# Patient Record
Sex: MALE | URBAN - METROPOLITAN AREA
[De-identification: names, ages, dates, MRNs, and addresses within clinical notes are randomized per-mention and may not be internally consistent; named-entity substitution may affect disease eponyms.]

---

## 2024-09-17 ENCOUNTER — OFFICE VISIT (OUTPATIENT)
Dept: URBAN - METROPOLITAN AREA CLINIC 72 | Facility: CLINIC | Age: 69
End: 2024-09-17
Payer: MEDICARE

## 2024-09-17 ENCOUNTER — LAB OUTSIDE AN ENCOUNTER (OUTPATIENT)
Dept: URBAN - METROPOLITAN AREA CLINIC 72 | Facility: CLINIC | Age: 69
End: 2024-09-17

## 2024-09-17 ENCOUNTER — DASHBOARD ENCOUNTERS (OUTPATIENT)
Age: 69
End: 2024-09-17

## 2024-09-17 VITALS
TEMPERATURE: 97.9 F | WEIGHT: 224 LBS | HEIGHT: 70 IN | BODY MASS INDEX: 32.07 KG/M2 | SYSTOLIC BLOOD PRESSURE: 148 MMHG | HEART RATE: 91 BPM | DIASTOLIC BLOOD PRESSURE: 67 MMHG

## 2024-09-17 DIAGNOSIS — D50.8 OTHER IRON DEFICIENCY ANEMIA: ICD-10-CM

## 2024-09-17 DIAGNOSIS — D61.818 PANCYTOPENIA: ICD-10-CM

## 2024-09-17 DIAGNOSIS — R79.89 ELEVATED LFTS: ICD-10-CM

## 2024-09-17 DIAGNOSIS — K74.69 OTHER CIRRHOSIS OF LIVER: ICD-10-CM

## 2024-09-17 PROBLEM — 19943007: Status: ACTIVE | Noted: 2024-09-17

## 2024-09-17 PROBLEM — 87522002: Status: ACTIVE | Noted: 2024-09-17

## 2024-09-17 PROBLEM — 127034005: Status: ACTIVE | Noted: 2024-09-17

## 2024-09-17 PROCEDURE — 99204 OFFICE O/P NEW MOD 45 MIN: CPT | Performed by: INTERNAL MEDICINE

## 2024-09-17 RX ORDER — THIAMINE HCL 100 MG
1 TABLET TABLET ORAL ONCE A DAY
Status: ACTIVE | COMMUNITY

## 2024-09-17 RX ORDER — ARIPIPRAZOLE 5 MG/1
1 TABLET TABLET ORAL ONCE A DAY
Status: ACTIVE | COMMUNITY

## 2024-09-17 RX ORDER — FERROUS SULFATE 325(65) MG
1 TABLET TABLET ORAL
Status: ACTIVE | COMMUNITY

## 2024-09-17 RX ORDER — QUETIAPINE 200 MG/1
1 TABLET TABLET, FILM COATED ORAL ONCE A DAY
Status: ACTIVE | COMMUNITY

## 2024-09-17 NOTE — HPI-TODAY'S VISIT:
Mr. Lugo is a pleasant 68-year-old presents as a new patient for evaluation of "liver issues."  He has past medical history of bipolar disorder, depression, coronary artery disease with stent not on anticoagulation.  He was seen in the hospital in August with anemia and occult positive blood in his stool, had an upper endoscopy by a surgeon that showed gastritis, received 2 units of blood while hospitalized for hemoglobin of 7.  He was noted to have pancytopenia.  He had a CT scan that showed coronary artery calcifications mild nodular contour of the liver, cholelithiasis without cholecystitis, splenomegaly no evidence of active bleeding.  Lab work 8/22/2024 WBC 1.6, hemoglobin 6.9, repeat 6.4, hematocrit 20.4 with repeat 19.3, MCV 82, platelet count 117, sodium 127, creatinine 0.74, albumin 3.6, AST 40, ALT 20, alk phos 118, bilirubin 1.2, INR 1.2, ferritin 14.9, TIBC 447 with iron saturation of 30, hemoglobin 6.4  8/24/2024 WBC 2.1, hemoglobin 7.4, MCV 82, platelet count 112, sodium 133, potassium 3.8, creatinine 0.63, albumin 3.2, alk phos 99, ALT 20, AST 37, bilirubin 1.7  He is somewhat a poor historian.  He reports that he was told back in March that he likely had cirrhosis.  He stopped drinking at that time but cannot tell me much more about those events.  He has never seen anybody in regards to cirrhosis that he is aware of.  He reports he has had no further issues with bleeding.  He believes he had a colonoscopy within the last 5 years and did not have any colon polyps.  He denies any weight gain.  Does appear that he is on some diuretics although he is not sure of the dose.

## 2024-09-17 NOTE — EXAM-PHYSICAL EXAM
General--no acute distress, resting comfortably Eyes--anicteric, no pallor HENT--normocephalic, atraumatic head Neck--no lymphadenopathy, symmetric Chest--non labored breathing, equal rise Abdomen--soft, non tender, non distended, no organomegaly Ext: ROBERT, no obvious sores or rashes

## 2024-10-15 ENCOUNTER — TELEPHONE ENCOUNTER (OUTPATIENT)
Dept: URBAN - METROPOLITAN AREA CLINIC 113 | Facility: CLINIC | Age: 69
End: 2024-10-15

## 2024-10-16 ENCOUNTER — OFFICE VISIT (OUTPATIENT)
Dept: URBAN - METROPOLITAN AREA CLINIC 72 | Facility: CLINIC | Age: 69
End: 2024-10-16
Payer: MEDICARE

## 2024-10-16 VITALS
SYSTOLIC BLOOD PRESSURE: 142 MMHG | HEIGHT: 70 IN | TEMPERATURE: 98.1 F | BODY MASS INDEX: 29.58 KG/M2 | HEART RATE: 64 BPM | DIASTOLIC BLOOD PRESSURE: 73 MMHG | WEIGHT: 206.6 LBS

## 2024-10-16 DIAGNOSIS — D61.818 PANCYTOPENIA: ICD-10-CM

## 2024-10-16 DIAGNOSIS — D50.8 OTHER IRON DEFICIENCY ANEMIA: ICD-10-CM

## 2024-10-16 DIAGNOSIS — Z91.199 NON-ADHERENCE TO MEDICAL TREATMENT: ICD-10-CM

## 2024-10-16 DIAGNOSIS — R79.89 ELEVATED LFTS: ICD-10-CM

## 2024-10-16 DIAGNOSIS — K74.69 CIRRHOSIS, CRYPTOGENIC: ICD-10-CM

## 2024-10-16 PROCEDURE — 99214 OFFICE O/P EST MOD 30 MIN: CPT | Performed by: INTERNAL MEDICINE

## 2024-10-16 RX ORDER — ARIPIPRAZOLE 5 MG/1
1 TABLET TABLET ORAL ONCE A DAY
Status: ACTIVE | COMMUNITY

## 2024-10-16 RX ORDER — THIAMINE HCL 100 MG
1 TABLET TABLET ORAL ONCE A DAY
Status: ACTIVE | COMMUNITY

## 2024-10-16 RX ORDER — FERROUS SULFATE 325(65) MG
1 TABLET TABLET ORAL
Status: ACTIVE | COMMUNITY

## 2024-10-16 RX ORDER — QUETIAPINE 200 MG/1
1 TABLET TABLET, FILM COATED ORAL ONCE A DAY
Status: ACTIVE | COMMUNITY

## 2024-10-16 NOTE — HPI-TODAY'S VISIT:
Mr. Lugo returns for follow-up.  Recall he is a 69-year-old last seen in office on 9/17/2024.  He was referred to us for anemia and "liver issues".  He was hospitalized in August with anemia had occult positive blood in stool, upper endoscopy by surgeon showed gastritis.  No biopsies were done.  A CT scan with contrast showed coronary artery calcifications and nodular contour of the liver, cholelithiasis, splenomegaly no evidence of active bleeding.  He was transfused during the hospitalization.  He was noted to have pancytopenia as well.  He does have a history of alcohol use.  He is a poor historian.  He believes he had a colonoscopy in the recent past.  We arranged for lab work and imaging including chronic liver disease workup labs.  A phone call was received yesterday stating that he did not have any of the lab work or imaging done. He was on diuretics when we saw him he is not taking this currently.  Does appear that his weight has decreased by almost 20 pounds.  He is a little more conversive today.  He is unsure if he got blood work done for the ultrasound done.  Again according to the phone call received yesterday and he has not done this.  He feels as though his abdomen is still protuberant but notes that his swelling has improved some.  He has not drank any alcohol.  He is concerned that his weight has not changed as much as he would like.  He denies any abdominal pain etc.  He asks "do I have cirrhosis?  Am I going to die?"  Summary of lab work: 8/22/2024 WBC 1.6, hemoglobin 6.9, platelet count 117, sodium 127, creatinine 0.74, albumin 3.6, AST 40, ALT 20, alk phos 118, bilirubin 1.2, INR 1.2, ferritin 14.9, TIBC 447  8/24/2024 WBC 2.1, hemoglobin 7.4, platelet 112, sodium 133, testing 3.8, creatinine 0.63, albumin 3.2, alk phos 99, ALT 20, AST 37, bilirubin 1.7

## 2024-11-01 ENCOUNTER — TELEPHONE ENCOUNTER (OUTPATIENT)
Dept: URBAN - METROPOLITAN AREA CLINIC 72 | Facility: CLINIC | Age: 69
End: 2024-11-01

## 2024-11-19 ENCOUNTER — OFFICE VISIT (OUTPATIENT)
Dept: URBAN - METROPOLITAN AREA CLINIC 72 | Facility: CLINIC | Age: 69
End: 2024-11-19
Payer: MEDICARE

## 2024-11-19 ENCOUNTER — LAB OUTSIDE AN ENCOUNTER (OUTPATIENT)
Dept: URBAN - METROPOLITAN AREA CLINIC 72 | Facility: CLINIC | Age: 69
End: 2024-11-19

## 2024-11-19 VITALS
TEMPERATURE: 98.2 F | DIASTOLIC BLOOD PRESSURE: 74 MMHG | HEART RATE: 73 BPM | HEIGHT: 70 IN | WEIGHT: 207.2 LBS | SYSTOLIC BLOOD PRESSURE: 148 MMHG | BODY MASS INDEX: 29.66 KG/M2

## 2024-11-19 DIAGNOSIS — R77.2 ELEVATED AFP: ICD-10-CM

## 2024-11-19 DIAGNOSIS — K83.8 COMMON BILE DUCT DILATATION: ICD-10-CM

## 2024-11-19 DIAGNOSIS — R17 ELEVATED BILIRUBIN: ICD-10-CM

## 2024-11-19 DIAGNOSIS — R93.5 ABNORMAL ABDOMINAL ULTRASOUND: ICD-10-CM

## 2024-11-19 DIAGNOSIS — K74.60 CIRRHOSIS OF LIVER WITHOUT ASCITES, UNSPECIFIED HEPATIC CIRRHOSIS TYPE: ICD-10-CM

## 2024-11-19 PROBLEM — 26165005: Status: ACTIVE | Noted: 2024-11-19

## 2024-11-19 PROBLEM — 15633921000119109: Status: ACTIVE | Noted: 2024-11-19

## 2024-11-19 PROBLEM — 166561008: Status: ACTIVE | Noted: 2024-11-19

## 2024-11-19 PROBLEM — 123608004: Status: ACTIVE | Noted: 2024-11-19

## 2024-11-19 PROCEDURE — 99214 OFFICE O/P EST MOD 30 MIN: CPT

## 2024-11-19 RX ORDER — THIAMINE HCL 100 MG
1 TABLET TABLET ORAL ONCE A DAY
Status: ACTIVE | COMMUNITY

## 2024-11-19 RX ORDER — ARIPIPRAZOLE 5 MG/1
1 TABLET TABLET ORAL ONCE A DAY
Status: ACTIVE | COMMUNITY

## 2024-11-19 RX ORDER — QUETIAPINE 200 MG/1
1 TABLET TABLET, FILM COATED ORAL ONCE A DAY
Status: ACTIVE | COMMUNITY

## 2024-11-19 RX ORDER — FERROUS SULFATE 325(65) MG
1 TABLET TABLET ORAL
Status: ACTIVE | COMMUNITY

## 2024-11-19 NOTE — EXAM-FUNCTIONAL ASSESSMENT
General--no acute distress, normal appearance Eyes--anicteric, no pallor HENT--normocephalic, atraumatic head Neck--no lymphadenopathy, symmetric Chest--non labored, equal chest rise Heart--regular rate Abdomen--soft, non tender, non distended, no organomegaly Skin--no rashes, no jaundice Neurologic--Alert and oriented x 3, answers questions appropriately Psychiatric--stable mood, appropriate affect  16-Jan-2020 14:52

## 2024-11-19 NOTE — HPI-TODAY'S VISIT:
Patient is a 69-year-old male last seen in the office on 10/16/2024 by Dr. Carolina Del Toro for cirrhosis, iron deficiency anemia, pancytopenia, elevated LFTs, and nonadherence to medical treatment.  Ultrasound and lab work from previous issue visit was once again given to patient to assess his liver disease.  Patient was on diuretics, but stopped them.  Weight has decreased by almost 20 pounds.  Patient is seen in the office today to review his labs and scans. He is feeling good. His weight has gone down - he is staying steady around 206. No ascites, has some confusion - but it is "not as bad as it was when he was first diagnosed". No bruising issues.   He thinks he had an EGD/Colon last year at the [facility]. Will request records. NO polyps.   BM's daily. NO melena or blood in the stool. No GERD, dysphagia, or heartburn.  No abd pain.   No FH of colon/GI cancer.

## 2024-11-19 NOTE — HPI-OTHER HISTORIES
Labs:  10/2/24 -immunoglobulin G 1892, IgA 1054, BUN 11, creatinine 0.85, , K3.8, , total bili 1.2, AST 33, ALT 15, TIBC 457, UIBC 344, iron 113, iron saturation 25%, hep B nonreactive, hep C nonreactive, ESR 76, CRP 9, ferritin 33, hep A (-)  10/16/2024-WBC 3.1, RBC 3.33, hemoglobin 9.0, hematocrit 27.4, MCV 82, platelet count 141, smooth muscle antibody 26, mitochondrial antibody less than 20, HCV nonreactive, liver kidney microsomal antibody 3.5, immunoglobulin A 946, immunoglobulin M 107, immunoglobulin G 1756, hep B antibody nonreactive, hep B antigen negative, ceruloplasmin 30.3, , K3.9, BUN 11, creatinine 0.68, , ALT 24, AST 39, total bili 1.3, PT 12.5, INR 1.2, hemochromatosis not detected, , EFREN positive  8/22/2024 WBC 1.6, hemoglobin 6.9, platelet count 117, sodium 127, creatinine 0.74, albumin 3.6, AST 40, ALT 20, alk phos 118, bilirubin 1.2, INR 1.2, ferritin 14.9, TIBC 447 8/24/2024 WBC 2.1, hemoglobin 7.4, platelet 112, sodium 133, testing 3.8, creatinine 0.63, albumin 3.2, alk phos 99, ALT 20, AST 37, bilirubin 1.7  DI:  10/23/2024-ultrasound abdomen complete imaging demonstrates mild lobulated contour.  Hepatic parenchyma appears preserved.  No masses.  Liver measures 22.7 cm.  Hepatopetal directional flow of the main portal vein.  Gallbladder contracted.  Small hyperechoic focus measuring 5.6 mm adherent to the wall which may reside polyp versus small stone.  CBD 6.8 mm.  Spleen is enlarged measuring 17.2 cm.  A CT scan with contrast showed coronary artery calcifications and nodular contour of the liver, cholelithiasis, splenomegaly no evidence of active bleeding.

## 2024-12-17 ENCOUNTER — OFFICE VISIT (OUTPATIENT)
Dept: URBAN - METROPOLITAN AREA CLINIC 72 | Facility: CLINIC | Age: 69
End: 2024-12-17
Payer: MEDICARE

## 2024-12-17 VITALS
DIASTOLIC BLOOD PRESSURE: 69 MMHG | TEMPERATURE: 98.1 F | HEIGHT: 70 IN | SYSTOLIC BLOOD PRESSURE: 148 MMHG | BODY MASS INDEX: 30.26 KG/M2 | WEIGHT: 211.4 LBS | HEART RATE: 68 BPM

## 2024-12-17 DIAGNOSIS — Z91.199 NON-ADHERENCE TO MEDICAL TREATMENT: ICD-10-CM

## 2024-12-17 DIAGNOSIS — K74.60 CIRRHOSIS OF LIVER WITHOUT ASCITES, UNSPECIFIED HEPATIC CIRRHOSIS TYPE: ICD-10-CM

## 2024-12-17 DIAGNOSIS — R93.5 ABNORMAL ABDOMINAL ULTRASOUND: ICD-10-CM

## 2024-12-17 DIAGNOSIS — K83.8 COMMON BILE DUCT DILATATION: ICD-10-CM

## 2024-12-17 PROCEDURE — 99214 OFFICE O/P EST MOD 30 MIN: CPT | Performed by: INTERNAL MEDICINE

## 2024-12-17 RX ORDER — FERROUS SULFATE 325(65) MG
1 TABLET TABLET ORAL
Status: ACTIVE | COMMUNITY

## 2024-12-17 RX ORDER — ARIPIPRAZOLE 5 MG/1
1 TABLET TABLET ORAL ONCE A DAY
Status: ACTIVE | COMMUNITY

## 2024-12-17 RX ORDER — QUETIAPINE 200 MG/1
1 TABLET TABLET, FILM COATED ORAL ONCE A DAY
Status: ACTIVE | COMMUNITY

## 2024-12-17 RX ORDER — SPIRONOLACTONE AND HYDROCHLOROTHIAZIDE 25; 25 MG/1; MG/1
1 TABLET TABLET ORAL ONCE A DAY
Status: ACTIVE | COMMUNITY

## 2024-12-17 RX ORDER — THIAMINE HCL 100 MG
1 TABLET TABLET ORAL ONCE A DAY
Status: ACTIVE | COMMUNITY

## 2024-12-17 NOTE — HPI-TODAY'S VISIT:
Mr. Lugo is a pleasant 69-year-old who returns for follow-up.  He was last seen in office on 11/19/2024.  He has a history of iron deficiency anemia/pancytopenia, elevated liver function test with cirrhosis and medical nonadherence.  He has had issues with pedal edema for which she was on diuretics intermittently.  He has not had alcoholl since august.  We got him set up for an MRI due to a bile duct dilatation.  He feels relatively well overall.  He is having some issues with knee pain.  He does not have a PCP.  Lab work 10/2/2024: Total bili 1.2, AST 33, ALT 15, CRP 9 sed rate 76 ferritin 33 hep panel nonreactive, creatinine 0.85, sodium 133, alk phos 143 10/16/2024 hemoglobin 9, MCV 82, platelet count 141, smooth muscle antibody 26, sodium 133, albumin 3.9, creatinine 0.68 alk phos 141, ALT 24, AST 39, bili 1.3, INR 1.2, , EFREN positive. MELD 3.0 is 12  10/23/2024 abdominal ultrasound showed mild lobular contour without masses and contracted gallbladder with small hyperechoic focus measuring 5.6 mm adherent to the wall possibly polyp versus stone is 6.8 mm CBD, splenomegaly  8/23/2024 he had an upper endoscopy by surgeon in which no varices were reported.  MRI 12/3/2024 showed enlarged liver with questionable mild nodular contour without suspicious lesions, splenomegaly up to 20 cm, gallbladder normal, trace abdominal fluid, small bilateral renal cysts

## 2025-03-18 ENCOUNTER — OFFICE VISIT (OUTPATIENT)
Dept: URBAN - METROPOLITAN AREA CLINIC 72 | Facility: CLINIC | Age: 70
End: 2025-03-18

## 2025-04-24 ENCOUNTER — TELEPHONE ENCOUNTER (OUTPATIENT)
Dept: URBAN - METROPOLITAN AREA CLINIC 72 | Facility: CLINIC | Age: 70
End: 2025-04-24

## 2025-04-24 ENCOUNTER — P2P PATIENT RECORD (OUTPATIENT)
Age: 70
End: 2025-04-24

## 2025-04-28 PROBLEM — 724556004: Status: ACTIVE | Noted: 2025-04-28

## 2025-04-28 PROBLEM — 17709002: Status: ACTIVE | Noted: 2025-04-28

## 2025-04-28 PROBLEM — 420054005: Status: ACTIVE | Noted: 2025-04-28

## 2025-04-29 ENCOUNTER — OFFICE VISIT (OUTPATIENT)
Dept: URBAN - METROPOLITAN AREA CLINIC 72 | Facility: CLINIC | Age: 70
End: 2025-04-29
Payer: MEDICARE

## 2025-04-29 ENCOUNTER — LAB OUTSIDE AN ENCOUNTER (OUTPATIENT)
Dept: URBAN - METROPOLITAN AREA CLINIC 72 | Facility: CLINIC | Age: 70
End: 2025-04-29

## 2025-04-29 DIAGNOSIS — D50.0 IRON DEFICIENCY ANEMIA DUE TO CHRONIC BLOOD LOSS: ICD-10-CM

## 2025-04-29 DIAGNOSIS — I85.11 SECONDARY ESOPHAGEAL VARICES WITH BLEEDING: ICD-10-CM

## 2025-04-29 DIAGNOSIS — K70.30 ALCOHOLIC CIRRHOSIS, UNSPECIFIED WHETHER ASCITES PRESENT: ICD-10-CM

## 2025-04-29 PROCEDURE — 99214 OFFICE O/P EST MOD 30 MIN: CPT

## 2025-04-29 RX ORDER — QUETIAPINE 200 MG/1
1 TABLET TABLET, FILM COATED ORAL ONCE A DAY
Status: ACTIVE | COMMUNITY

## 2025-04-29 RX ORDER — FERROUS SULFATE 325(65) MG
1 TABLET TABLET ORAL
Status: ACTIVE | COMMUNITY

## 2025-04-29 RX ORDER — SPIRONOLACTONE AND HYDROCHLOROTHIAZIDE 25; 25 MG/1; MG/1
1 TABLET TABLET ORAL ONCE A DAY
Status: ACTIVE | COMMUNITY

## 2025-04-29 RX ORDER — THIAMINE HCL 100 MG
1 TABLET TABLET ORAL ONCE A DAY
Status: ACTIVE | COMMUNITY

## 2025-04-29 RX ORDER — PANTOPRAZOLE SODIUM 40 MG/1
1 TABLET 1/2 TO 1 HOUR BEFORE MORNING MEAL TABLET, DELAYED RELEASE ORAL ONCE A DAY
Qty: 90 TABLET | Refills: 1 | OUTPATIENT
Start: 2025-04-29

## 2025-04-29 RX ORDER — ARIPIPRAZOLE 5 MG/1
1 TABLET TABLET ORAL ONCE A DAY
Status: ACTIVE | COMMUNITY

## 2025-04-29 NOTE — HPI-TODAY'S VISIT:
Patient is a 69-year-old male recently seen on 4/23/2025 in consultation at [facility] by Dr. Del Toro with a history of alcoholic cirrhosis, admitted for anemia requiring blood transfusions for a presumed GI bleed.  Patient needs EGD/colonoscopy.  Last EGD noted to be 8/23/2024.  Patient is seen today and states that he has had 11-12 blood transfusions since the beginning of the year, according to family. He sees Dr Finn and received an Fe infusion yesterday. He had labs done yesterday.   Patient denies melena, hematemesis, no SOB, occasional dizziness. He states that he had a paracentesis done 2-3 weeks ago. Belly is a little bit distended. BM's daily. Has occasional confusion. Denies abnormal bruising.

## 2025-04-29 NOTE — EXAM-PHYSICAL EXAM
General- no acute distress, normal appearance Eyes- anicteric, no pallor HENT- normocephalic, atraumatic head Neck- no lymphadenopathy, symmetric Chest- non labored, equal chest rise Heart- regular rate Abdomen- soft, non tender, distended, no organomegaly Skin- no rashes, no jaundice Neurologic- Alert and oriented x 3, answers questions appropriately Psychiatric- stable mood, appropriate affect  LE edema

## 2025-04-29 NOTE — HPI-OTHER HISTORIES
Procedures: 8/23/2024-EGD: Mild proximal gastritis.  Normal EGD otherwise to the second portion of the duodenum.  Labs: 4/23/2025-WBC 1.8, RBC 2.09, hemoglobin 7.2, hematocrit 23.2, MCV 85, platelets 126, , K3.4, creatinine 0.6, BUN 11, total bili 1.4, , AST 24, ALT 12, magnesium 1.9  10/2/24 -immunoglobulin G 1892, IgA 1054, BUN 11, creatinine 0.85, , K3.8, , total bili 1.2, AST 33, ALT 15, TIBC 457, UIBC 344, iron 113, iron saturation 25%, hep B nonreactive, hep C nonreactive, ESR 76, CRP 9, ferritin 33, hep A (-)  10/16/2024-WBC 3.1, RBC 3.33, hemoglobin 9.0, hematocrit 27.4, MCV 82, platelet count 141, smooth muscle antibody 26, mitochondrial antibody less than 20, HCV nonreactive, liver kidney microsomal antibody 3.5, immunoglobulin A 946, immunoglobulin M 107, immunoglobulin G 1756, hep B antibody nonreactive, hep B antigen negative, ceruloplasmin 30.3, , K3.9, BUN 11, creatinine 0.68, , ALT 24, AST 39, total bili 1.3, PT 12.5, INR 1.2, hemochromatosis not detected, , EFREN positive  8/22/2024 WBC 1.6, hemoglobin 6.9, platelet count 117, sodium 127, creatinine 0.74, albumin 3.6, AST 40, ALT 20, alk phos 118, bilirubin 1.2, INR 1.2, ferritin 14.9, TIBC 447 8/24/2024 WBC 2.1, hemoglobin 7.4, platelet 112, sodium 133, testing 3.8, creatinine 0.63, albumin 3.2, alk phos 99, ALT 20, AST 37, bilirubin 1.7  DI: 12/3/2024-MRI abdomen: Enlarged liver measuring 21 cm in the craniocaudal dimension.  Questionable mild nodular liver contour.  No suspicious liver lesion.  Enlarged spleen measuring 20 cm in the AP dimension.  4.2 cm left renal cyst.  Trace abdominal free fluid.  Hepatosplenomegaly with equivocal findings of mild cirrhotic morphology.  10/23/2024-ultrasound abdomen complete imaging demonstrates mild lobulated contour.  Hepatic parenchyma appears preserved.  No masses.  Liver measures 22.7 cm.  Hepatopetal directional flow of the main portal vein.  Gallbladder contracted.  Small hyperechoic focus measuring 5.6 mm adherent to the wall which may reside polyp versus small stone.  CBD 6.8 mm.  Spleen is enlarged measuring 17.2 cm.  A CT scan with contrast showed coronary artery calcifications and nodular contour of the liver, cholelithiasis, splenomegaly no evidence of active bleeding.

## 2025-05-01 ENCOUNTER — TELEPHONE ENCOUNTER (OUTPATIENT)
Dept: URBAN - METROPOLITAN AREA CLINIC 72 | Facility: CLINIC | Age: 70
End: 2025-05-01

## 2025-05-01 ENCOUNTER — OFFICE VISIT (OUTPATIENT)
Dept: URBAN - METROPOLITAN AREA MEDICAL CENTER 40 | Facility: MEDICAL CENTER | Age: 70
End: 2025-05-01
Payer: MEDICARE

## 2025-05-01 DIAGNOSIS — K29.60 ADENOPAPILLOMATOSIS GASTRICA: ICD-10-CM

## 2025-05-01 DIAGNOSIS — D50.9 ANEMIA: ICD-10-CM

## 2025-05-01 DIAGNOSIS — K70.30 ALC (ALCOHOLIC LIVER CIRRHOSIS): ICD-10-CM

## 2025-05-01 DIAGNOSIS — I85.10 ESOPH VARICE OTHER DIS: ICD-10-CM

## 2025-05-01 DIAGNOSIS — D12.2 ADENOMA OF ASCENDING COLON: ICD-10-CM

## 2025-05-01 DIAGNOSIS — D12.0 ADENOMA OF CECUM: ICD-10-CM

## 2025-05-01 DIAGNOSIS — D12.3 ADENOMA OF TRANSVERSE COLON: ICD-10-CM

## 2025-05-01 PROCEDURE — 45385 COLONOSCOPY W/LESION REMOVAL: CPT | Performed by: INTERNAL MEDICINE

## 2025-05-01 PROCEDURE — 43235 EGD DIAGNOSTIC BRUSH WASH: CPT | Performed by: INTERNAL MEDICINE

## 2025-05-01 RX ORDER — ARIPIPRAZOLE 5 MG/1
1 TABLET TABLET ORAL ONCE A DAY
Status: ACTIVE | COMMUNITY

## 2025-05-01 RX ORDER — SPIRONOLACTONE AND HYDROCHLOROTHIAZIDE 25; 25 MG/1; MG/1
1 TABLET TABLET ORAL ONCE A DAY
Status: ACTIVE | COMMUNITY

## 2025-05-01 RX ORDER — THIAMINE HCL 100 MG
1 TABLET TABLET ORAL ONCE A DAY
Status: ACTIVE | COMMUNITY

## 2025-05-01 RX ORDER — QUETIAPINE 200 MG/1
1 TABLET TABLET, FILM COATED ORAL ONCE A DAY
Status: ACTIVE | COMMUNITY

## 2025-05-01 RX ORDER — PANTOPRAZOLE SODIUM 40 MG/1
1 TABLET 1/2 TO 1 HOUR BEFORE MORNING MEAL TABLET, DELAYED RELEASE ORAL ONCE A DAY
Qty: 90 TABLET | Refills: 1 | Status: ACTIVE | COMMUNITY
Start: 2025-04-29

## 2025-05-01 RX ORDER — FERROUS SULFATE 325(65) MG
1 TABLET TABLET ORAL
Status: ACTIVE | COMMUNITY

## 2025-05-02 ENCOUNTER — TELEPHONE ENCOUNTER (OUTPATIENT)
Dept: URBAN - METROPOLITAN AREA CLINIC 72 | Facility: CLINIC | Age: 70
End: 2025-05-02

## 2025-05-02 PROBLEM — 271737000: Status: ACTIVE | Noted: 2025-05-02

## 2025-05-07 ENCOUNTER — OFFICE VISIT (OUTPATIENT)
Dept: URBAN - METROPOLITAN AREA CLINIC 72 | Facility: CLINIC | Age: 70
End: 2025-05-07

## 2025-05-07 RX ORDER — FERROUS SULFATE 325(65) MG
1 TABLET TABLET ORAL
Status: ACTIVE | COMMUNITY

## 2025-05-07 RX ORDER — THIAMINE HCL 100 MG
1 TABLET TABLET ORAL ONCE A DAY
Status: ACTIVE | COMMUNITY

## 2025-05-07 RX ORDER — ARIPIPRAZOLE 5 MG/1
1 TABLET TABLET ORAL ONCE A DAY
Status: ACTIVE | COMMUNITY

## 2025-05-07 RX ORDER — QUETIAPINE 200 MG/1
1 TABLET TABLET, FILM COATED ORAL ONCE A DAY
Status: ACTIVE | COMMUNITY

## 2025-05-07 RX ORDER — SPIRONOLACTONE AND HYDROCHLOROTHIAZIDE 25; 25 MG/1; MG/1
1 TABLET TABLET ORAL ONCE A DAY
Status: ACTIVE | COMMUNITY

## 2025-05-07 RX ORDER — PANTOPRAZOLE SODIUM 40 MG/1
1 TABLET 1/2 TO 1 HOUR BEFORE MORNING MEAL TABLET, DELAYED RELEASE ORAL ONCE A DAY
Qty: 90 TABLET | Refills: 1 | Status: ACTIVE | COMMUNITY
Start: 2025-04-29

## 2025-05-07 NOTE — EXAM-PHYSICAL EXAM
General- no acute distress, resting comfortably Eyes- anicteric, no pallor HENT- normocephalic, atraumatic head Neck- no lymphadenopathy, symmetric Chest- non labored breathing, equal rise Abdomen- soft, non tender, non distended, no organomegaly Ext: ROBERT, no obvious sores or rashes

## 2025-05-13 ENCOUNTER — OFFICE VISIT (OUTPATIENT)
Dept: URBAN - METROPOLITAN AREA CLINIC 72 | Facility: CLINIC | Age: 70
End: 2025-05-13

## 2025-05-22 ENCOUNTER — LAB OUTSIDE AN ENCOUNTER (OUTPATIENT)
Dept: URBAN - METROPOLITAN AREA CLINIC 72 | Facility: CLINIC | Age: 70
End: 2025-05-22

## 2025-05-22 ENCOUNTER — OFFICE VISIT (OUTPATIENT)
Dept: URBAN - METROPOLITAN AREA CLINIC 72 | Facility: CLINIC | Age: 70
End: 2025-05-22
Payer: MEDICARE

## 2025-05-22 DIAGNOSIS — K70.31 ASCITES DUE TO ALCOHOLIC CIRRHOSIS: ICD-10-CM

## 2025-05-22 DIAGNOSIS — D64.9 ACUTE ANEMIA: ICD-10-CM

## 2025-05-22 DIAGNOSIS — D61.818 PANCYTOPENIA: ICD-10-CM

## 2025-05-22 DIAGNOSIS — F10.11 ALCOHOL ABUSE, IN REMISSION: ICD-10-CM

## 2025-05-22 DIAGNOSIS — K70.30 ALCOHOLIC CIRRHOSIS, UNSPECIFIED WHETHER ASCITES PRESENT: ICD-10-CM

## 2025-05-22 PROBLEM — 1082601000119104: Status: ACTIVE | Noted: 2025-05-22

## 2025-05-22 PROCEDURE — 99214 OFFICE O/P EST MOD 30 MIN: CPT

## 2025-05-22 RX ORDER — PANTOPRAZOLE SODIUM 40 MG/1
1 TABLET 1/2 TO 1 HOUR BEFORE MORNING MEAL TABLET, DELAYED RELEASE ORAL ONCE A DAY
Qty: 90 TABLET | Refills: 1 | Status: ON HOLD | COMMUNITY
Start: 2025-04-29

## 2025-05-22 RX ORDER — SPIRONOLACTONE AND HYDROCHLOROTHIAZIDE 25; 25 MG/1; MG/1
1 TABLET TABLET ORAL ONCE A DAY
Status: ACTIVE | COMMUNITY

## 2025-05-22 RX ORDER — SPIRONOLACTONE 25 MG/1
ONE PILL DAILY TABLET, FILM COATED ORAL DAILY
Qty: 30 | Refills: 1 | OUTPATIENT
Start: 2025-05-22

## 2025-05-22 RX ORDER — FERROUS SULFATE 325(65) MG
1 TABLET TABLET ORAL
Status: ACTIVE | COMMUNITY

## 2025-05-22 RX ORDER — QUETIAPINE 200 MG/1
1 TABLET TABLET, FILM COATED ORAL ONCE A DAY
Status: ACTIVE | COMMUNITY

## 2025-05-22 RX ORDER — THIAMINE HCL 100 MG
1 TABLET TABLET ORAL ONCE A DAY
Status: ACTIVE | COMMUNITY

## 2025-05-22 RX ORDER — FUROSEMIDE 20 MG/1
1 TABLET TABLET ORAL ONCE A DAY
Qty: 30 | Refills: 1 | OUTPATIENT
Start: 2025-05-22 | End: 2025-07-21

## 2025-05-22 RX ORDER — ARIPIPRAZOLE 5 MG/1
1 TABLET TABLET ORAL ONCE A DAY
Status: ON HOLD | COMMUNITY

## 2025-05-22 NOTE — HPI-TODAY'S VISIT:
Patient is a 69-year-old male last seen in the office on 4/29/2025 with a history of alcoholic cirrhosis, secondary esophageal varices, and iron deficiency anemia.  Patient has required multiple blood transfusions.  He was scheduled for a EGD and colonoscopy on 5/1/2025 which did not show any bleeding.  Patient had CBC drawn on on 4/28/2025 which resulted in hemoglobin of 4.8.  He was instructed to go to the ER.  Patient was given 2 units PRBC.  Patient needs to be started on diuretics and needs to have labs checked.  Patient is seen in the office today. He is doing well. He is not sure when his last blood transfusion was. Last Fe infusion was atleast a month ago. Last labs were in the hospital on 5/2/25.

## 2025-05-22 NOTE — HPI-OTHER HISTORIES
Procedures: 5/1/2025-EGD/colonoscopy: - EGD: Duodenum normal.  Stomach normal.  Minimal nonbleeding antral gastritis seen.  Esophagus appeared normal.  Very small varix, nonbleeding, no alarm features.  Z-line noted to be at 40 cm from incisors.  Repeat EGD 1 year.  Due 5/2026.  - Colonoscopy: Perianal and digital rectal examinations were normal.  Terminal ileum appeared normal.  Diverticulosis, nonbleeding.  2 x 1 cm pedunculated cecal polyp.  2 hemoclips placed to stop bleeding.  Path: Transverse, ascending, cecal polyp all tubular adenomas.  Not all polyps removed.  Repeat colonoscopy in 6 months.  Due 11/2025.  8/23/2024-EGD: Mild proximal gastritis.  Normal EGD otherwise to the second portion of the duodenum.  Labs: 5/2/2025-WBC 4.8, RBC 1.67, hemoglobin 4.8, hematocrit 15.5, MCV 93, platelet 135, INR 1.2, PT 13.1, , K3.6, BUN 9.6, creatinine 0.6, , ALT 15, AST 25, ammonia 53, total bili 0.97, glucose 107  4/29/2025-WBC 2.5, RBC 1.85, hemoglobin 4.8, hematocrit 15.8, MCV 85, platelets 130, glucose 115, BUN 10, creatinine 0.73, , K4.0, calcium 8.4, total bili 0.7, , AST 27, ALT 11, INR 1.2, PT 12.9, AFP 4.5  4/23/2025-WBC 1.8, RBC 2.09, hemoglobin 7.2, hematocrit 23.2, MCV 85, platelets 126, , K3.4, creatinine 0.6, BUN 11, total bili 1.4, , AST 24, ALT 12, magnesium 1.9  10/2/24 -immunoglobulin G 1892, IgA 1054, BUN 11, creatinine 0.85, , K3.8, , total bili 1.2, AST 33, ALT 15, TIBC 457, UIBC 344, iron 113, iron saturation 25%, hep B nonreactive, hep C nonreactive, ESR 76, CRP 9, ferritin 33, hep A (-)  10/16/2024-WBC 3.1, RBC 3.33, hemoglobin 9.0, hematocrit 27.4, MCV 82, platelet count 141, smooth muscle antibody 26, mitochondrial antibody less than 20, HCV nonreactive, liver kidney microsomal antibody 3.5, immunoglobulin A 946, immunoglobulin M 107, immunoglobulin G 1756, hep B antibody nonreactive, hep B antigen negative, ceruloplasmin 30.3, , K3.9, BUN 11, creatinine 0.68, , ALT 24, AST 39, total bili 1.3, PT 12.5, INR 1.2, hemochromatosis not detected, , EFREN positive  8/22/2024 WBC 1.6, hemoglobin 6.9, platelet count 117, sodium 127, creatinine 0.74, albumin 3.6, AST 40, ALT 20, alk phos 118, bilirubin 1.2, INR 1.2, ferritin 14.9, TIBC 447 8/24/2024 WBC 2.1, hemoglobin 7.4, platelet 112, sodium 133, testing 3.8, creatinine 0.63, albumin 3.2, alk phos 99, ALT 20, AST 37, bilirubin 1.7  DI: 5/3/2025-CTA abdomen and pelvis with contrast: Cirrhotic liver and splenomegaly.  No evidence of active GI hemorrhage.  Small volume ascites.  12/3/2024-MRI abdomen: Enlarged liver measuring 21 cm in the craniocaudal dimension.  Questionable mild nodular liver contour.  No suspicious liver lesion.  Enlarged spleen measuring 20 cm in the AP dimension.  4.2 cm left renal cyst.  Trace abdominal free fluid.  Hepatosplenomegaly with equivocal findings of mild cirrhotic morphology.  10/23/2024-ultrasound abdomen complete imaging demonstrates mild lobulated contour.  Hepatic parenchyma appears preserved.  No masses.  Liver measures 22.7 cm.  Hepatopetal directional flow of the main portal vein.  Gallbladder contracted.  Small hyperechoic focus measuring 5.6 mm adherent to the wall which may reside polyp versus small stone.  CBD 6.8 mm.  Spleen is enlarged measuring 17.2 cm.  A CT scan with contrast showed coronary artery calcifications and nodular contour of the liver, cholelithiasis, splenomegaly no evidence of active bleeding.

## 2025-05-29 ENCOUNTER — TELEPHONE ENCOUNTER (OUTPATIENT)
Dept: URBAN - METROPOLITAN AREA CLINIC 72 | Facility: CLINIC | Age: 70
End: 2025-05-29

## 2025-05-29 PROBLEM — 712509002: Status: ACTIVE | Noted: 2025-05-29

## 2025-06-24 ENCOUNTER — TELEPHONE ENCOUNTER (OUTPATIENT)
Dept: URBAN - METROPOLITAN AREA CLINIC 72 | Facility: CLINIC | Age: 70
End: 2025-06-24

## 2025-06-24 ENCOUNTER — OFFICE VISIT (OUTPATIENT)
Dept: URBAN - METROPOLITAN AREA CLINIC 72 | Facility: CLINIC | Age: 70
End: 2025-06-24
Payer: MEDICARE

## 2025-06-24 ENCOUNTER — LAB OUTSIDE AN ENCOUNTER (OUTPATIENT)
Dept: URBAN - METROPOLITAN AREA CLINIC 72 | Facility: CLINIC | Age: 70
End: 2025-06-24

## 2025-06-24 DIAGNOSIS — R77.2 ELEVATED AFP: ICD-10-CM

## 2025-06-24 DIAGNOSIS — K74.60 CIRRHOSIS OF LIVER WITHOUT ASCITES, UNSPECIFIED HEPATIC CIRRHOSIS TYPE: ICD-10-CM

## 2025-06-24 DIAGNOSIS — D50.0 IRON DEFICIENCY ANEMIA DUE TO CHRONIC BLOOD LOSS: ICD-10-CM

## 2025-06-24 DIAGNOSIS — K92.2: ICD-10-CM

## 2025-06-24 DIAGNOSIS — D61.818 PANCYTOPENIA: ICD-10-CM

## 2025-06-24 DIAGNOSIS — Z86.0101 PERSONAL HISTORY OF ADENOMATOUS AND SERRATED COLON POLYPS: ICD-10-CM

## 2025-06-24 DIAGNOSIS — Z91.199 NON-ADHERENCE TO MEDICAL TREATMENT: ICD-10-CM

## 2025-06-24 PROCEDURE — 99213 OFFICE O/P EST LOW 20 MIN: CPT | Performed by: INTERNAL MEDICINE

## 2025-06-24 RX ORDER — SPIRONOLACTONE AND HYDROCHLOROTHIAZIDE 25; 25 MG/1; MG/1
1 TABLET TABLET ORAL ONCE A DAY
Status: ACTIVE | COMMUNITY

## 2025-06-24 RX ORDER — FERROUS SULFATE 325(65) MG
1 TABLET TABLET ORAL
Status: ACTIVE | COMMUNITY

## 2025-06-24 RX ORDER — ARIPIPRAZOLE 5 MG/1
1 TABLET TABLET ORAL ONCE A DAY
Status: ON HOLD | COMMUNITY

## 2025-06-24 RX ORDER — PANTOPRAZOLE SODIUM 40 MG/1
1 TABLET 1/2 TO 1 HOUR BEFORE MORNING MEAL TABLET, DELAYED RELEASE ORAL ONCE A DAY
Qty: 90 TABLET | Refills: 1 | Status: ON HOLD | COMMUNITY
Start: 2025-04-29

## 2025-06-24 RX ORDER — QUETIAPINE 200 MG/1
1 TABLET TABLET, FILM COATED ORAL ONCE A DAY
Status: ACTIVE | COMMUNITY

## 2025-06-24 RX ORDER — THIAMINE HCL 100 MG
1 TABLET TABLET ORAL ONCE A DAY
Status: ACTIVE | COMMUNITY

## 2025-06-24 RX ORDER — SPIRONOLACTONE 25 MG/1
ONE PILL DAILY TABLET, FILM COATED ORAL DAILY
Qty: 30 | Refills: 1 | Status: ACTIVE | COMMUNITY
Start: 2025-05-22

## 2025-06-24 RX ORDER — FUROSEMIDE 20 MG/1
1 TABLET TABLET ORAL ONCE A DAY
Qty: 30 | Refills: 1 | Status: ACTIVE | COMMUNITY
Start: 2025-05-22 | End: 2025-07-21

## 2025-06-24 NOTE — HPI-TODAY'S VISIT:
Mr. Lugo returns for follow-up.  Recall he is a 69-year-old last seen in office on 2025.  He has a history of compensated alcoholic cirrhosis, he has been sober since 2024.  He does have varices that have been nonbleeding.  At last office visit diuretics were restarted for management of his ascites.  His main issue has centered around pancytopenia and iron deficiency anemia with recently discovered jejunal bleed on capsule endoscopy.  He was sent to [facility] for double-balloon enteroscopy.  He has been transfusion reliant getting both blood and iron infusions.  He reports that he is doing better.  He did go to the hospital over the weekend because he was feeling faint had labs done there was told he did not need a transfusion, I do not have those records available for my review.  He is not having any black dark or tarry stool.  He is not having any issues with fluid buildup.  He denies any recent bleeding.  He does have an appointment with [facility] at the end of July.  Procedures: 2025-EGD/colonoscopy: - EGD: Duodenum normal. Stomach normal. Minimal nonbleeding antral gastritis seen. Esophagus appeared normal. Very small varix, nonbleeding, no alarm features. Z-line noted to be at 40 cm from incisors. Repeat EGD 1 year. Due 2026. - Colonoscopy: Perianal and digital rectal examinations were normal. Terminal ileum appeared normal. Diverticulosis, nonbleeding. 2 x 1 cm pedunculated cecal polyp. 2 hemoclips placed to stop bleeding. Path: Transverse, ascending, cecal polyp all tubular adenomas. Not all polyps removed. Repeat colonoscopy in 6 months. Due 2025.  2024-EGD: Mild proximal gastritis. Normal EGD otherwise to the second portion of the duodenum.  Labs: 2025-WBC 4.8, RBC 1.67, hemoglobin 4.8, hematocrit 15.5, MCV 93, platelet 135, INR 1.2, PT 13.1, , K3.6, BUN 9.6, creatinine 0.6, , ALT 15, AST 25, ammonia 53, total bili 0.97, glucose 107/-WBC 2.5, RBC 1.85, hemoglobin 4.8, hematocrit 15.8, MCV 85, platelets 130, glucose 115, BUN 10, creatinine 0.73, , K4.0, calcium 8.4, total bili 0.7, , AST 27, ALT 11, INR 1.2, PT 12.9, AFP 4.5  2025-WBC 1.8, RBC 2.09, hemoglobin 7.2, hematocrit 23.2, MCV 85, platelets 126, , K3.4, creatinine 0.6, BUN 11, total bili 1.4, , AST 24, ALT 12, magnesium 1.9 10/2/24 -immunoglobulin G 1892, IgA 1054, BUN 11, creatinine 0.85, , K3.8, , total bili 1.2, AST 33, ALT 15, TIBC 457, UIBC 344, iron 113, iron saturation 25%, hep B nonreactive, hep C nonreactive, ESR 76, CRP 9, ferritin 33, hep A (-)  10/16/2024-WBC 3.1, RBC 3.33, hemoglobin 9.0, hematocrit 27.4, MCV 82, platelet count 141, smooth muscle antibody 26, mitochondrial antibody less than 20, HCV nonreactive, liver kidney microsomal antibody 3.5, immunoglobulin A 946, immunoglobulin M 107, immunoglobulin G 1756, hep B antibody nonreactive, hep B antigen negative, ceruloplasmin 30.3, , K3.9, BUN 11, creatinine 0.68, , ALT 24, AST 39, total bili 1.3, PT 12.5, INR 1.2, hemochromatosis not detected, , EFREN positive  Imagin/3/2025-CTA abdomen and pelvis with contrast: Cirrhotic liver and splenomegaly. No evidence of active GI hemorrhage. Small volume ascites.  12/3/2024-MRI abdomen: Enlarged liver measuring 21 cm in the craniocaudal dimension. Questionable mild nodular liver contour. No suspicious liver lesion. Enlarged spleen measuring 20 cm in the AP dimension. 4.2 cm left renal cyst. Trace abdominal free fluid. Hepatosplenomegaly with equivocal findings of mild cirrhotic morphology.